# Patient Record
Sex: MALE | Race: WHITE | NOT HISPANIC OR LATINO | Employment: FULL TIME | URBAN - METROPOLITAN AREA
[De-identification: names, ages, dates, MRNs, and addresses within clinical notes are randomized per-mention and may not be internally consistent; named-entity substitution may affect disease eponyms.]

---

## 2024-08-12 ENCOUNTER — OFFICE VISIT (OUTPATIENT)
Dept: PRIMARY CARE | Facility: CLINIC | Age: 64
End: 2024-08-12
Payer: COMMERCIAL

## 2024-08-12 VITALS
TEMPERATURE: 97.6 F | OXYGEN SATURATION: 97 % | SYSTOLIC BLOOD PRESSURE: 114 MMHG | BODY MASS INDEX: 26.68 KG/M2 | RESPIRATION RATE: 16 BRPM | HEART RATE: 54 BPM | WEIGHT: 166 LBS | DIASTOLIC BLOOD PRESSURE: 60 MMHG | HEIGHT: 66 IN

## 2024-08-12 DIAGNOSIS — H61.22 IMPACTED CERUMEN OF LEFT EAR: ICD-10-CM

## 2024-08-12 DIAGNOSIS — Z00.00 ROUTINE MEDICAL EXAM: ICD-10-CM

## 2024-08-12 DIAGNOSIS — Z00.00 ROUTINE GENERAL MEDICAL EXAMINATION AT A HEALTH CARE FACILITY: Primary | ICD-10-CM

## 2024-08-12 DIAGNOSIS — Z80.42 FAMILY HISTORY OF PROSTATE CANCER IN FATHER: ICD-10-CM

## 2024-08-12 DIAGNOSIS — Z12.5 SCREENING FOR MALIGNANT NEOPLASM OF PROSTATE: ICD-10-CM

## 2024-08-12 LAB
BASOPHILS # BLD AUTO: 0.04 X10*3/UL (ref 0–0.1)
BASOPHILS NFR BLD AUTO: 0.8 %
EOSINOPHIL # BLD AUTO: 0.08 X10*3/UL (ref 0–0.7)
EOSINOPHIL NFR BLD AUTO: 1.7 %
ERYTHROCYTE [DISTWIDTH] IN BLOOD BY AUTOMATED COUNT: 12 % (ref 11.5–14.5)
HCT VFR BLD AUTO: 45 % (ref 41–52)
HGB BLD-MCNC: 14.9 G/DL (ref 13.5–17.5)
IMM GRANULOCYTES # BLD AUTO: 0 X10*3/UL (ref 0–0.7)
IMM GRANULOCYTES NFR BLD AUTO: 0 % (ref 0–0.9)
LYMPHOCYTES # BLD AUTO: 2.39 X10*3/UL (ref 1.2–4.8)
LYMPHOCYTES NFR BLD AUTO: 49.7 %
MCH RBC QN AUTO: 30.5 PG (ref 26–34)
MCHC RBC AUTO-ENTMCNC: 33.1 G/DL (ref 32–36)
MCV RBC AUTO: 92 FL (ref 80–100)
MONOCYTES # BLD AUTO: 0.45 X10*3/UL (ref 0.1–1)
MONOCYTES NFR BLD AUTO: 9.4 %
NEUTROPHILS # BLD AUTO: 1.85 X10*3/UL (ref 1.2–7.7)
NEUTROPHILS NFR BLD AUTO: 38.4 %
NRBC BLD-RTO: 0 /100 WBCS (ref 0–0)
PLATELET # BLD AUTO: 196 X10*3/UL (ref 150–450)
RBC # BLD AUTO: 4.88 X10*6/UL (ref 4.5–5.9)
WBC # BLD AUTO: 4.8 X10*3/UL (ref 4.4–11.3)

## 2024-08-12 PROCEDURE — 85025 COMPLETE CBC W/AUTO DIFF WBC: CPT | Mod: WESLAB | Performed by: FAMILY MEDICINE

## 2024-08-12 PROCEDURE — 3008F BODY MASS INDEX DOCD: CPT | Performed by: FAMILY MEDICINE

## 2024-08-12 PROCEDURE — 36415 COLL VENOUS BLD VENIPUNCTURE: CPT | Performed by: FAMILY MEDICINE

## 2024-08-12 PROCEDURE — 80061 LIPID PANEL: CPT | Performed by: FAMILY MEDICINE

## 2024-08-12 PROCEDURE — 1036F TOBACCO NON-USER: CPT | Performed by: FAMILY MEDICINE

## 2024-08-12 PROCEDURE — 84443 ASSAY THYROID STIM HORMONE: CPT | Performed by: FAMILY MEDICINE

## 2024-08-12 PROCEDURE — 84153 ASSAY OF PSA TOTAL: CPT | Performed by: FAMILY MEDICINE

## 2024-08-12 PROCEDURE — 80053 COMPREHEN METABOLIC PANEL: CPT | Performed by: FAMILY MEDICINE

## 2024-08-12 PROCEDURE — 84439 ASSAY OF FREE THYROXINE: CPT | Performed by: FAMILY MEDICINE

## 2024-08-12 PROCEDURE — 99386 PREV VISIT NEW AGE 40-64: CPT | Performed by: FAMILY MEDICINE

## 2024-08-12 PROCEDURE — 69209 REMOVE IMPACTED EAR WAX UNI: CPT | Performed by: FAMILY MEDICINE

## 2024-08-12 ASSESSMENT — ENCOUNTER SYMPTOMS
CARDIOVASCULAR NEGATIVE: 1
GASTROINTESTINAL NEGATIVE: 1
MUSCULOSKELETAL NEGATIVE: 1
CONSTITUTIONAL NEGATIVE: 1
NEUROLOGICAL NEGATIVE: 1
RESPIRATORY NEGATIVE: 1

## 2024-08-12 NOTE — PROGRESS NOTES
"Patient ID: Clarke Guallpa is a 64 y.o. male.    Ear Cerumen Removal    Date/Time: 8/12/2024 9:09 AM    Performed by: Virgil Guerin DO  Authorized by: Virgil Guerin DO    Consent:     Consent obtained:  Verbal    Consent given by:  Patient    Risks, benefits, and alternatives were discussed: yes    Universal protocol:     Patient identity confirmed:  Verbally with patient  Procedure details:     Location:  L ear    Procedure type: irrigation      Procedure outcomes: cerumen removed    Post-procedure details:     Inspection:  Ear canal clear    Hearing quality:  Normal    Procedure completion:  Tolerated  Subjective   Patient ID: Clarke Guallpa is a 64 y.o. male who presents for New Patient Visit (Pt is here to establish care and for fbw. ).    HPI last labs done in 2019 with slightly elevated psa at 4.3 and recheck was 3.7.  glucose slightly elevated at 112 and A1c at 5.6  He had colonoscopy 5 years ago recheck 10 year.  No prior heart issues or testing. Nonsmoker.  Occasional etoh.   Mother decreased from lung ca.  Father with prostate cancer in 70s.   Works as an  in the rubber industry.   Likes to lift wts.     Review of Systems   Constitutional: Negative.    HENT: Negative.     Respiratory: Negative.     Cardiovascular: Negative.    Gastrointestinal: Negative.    Genitourinary: Negative.    Musculoskeletal: Negative.    Neurological: Negative.        Objective   /60 (BP Location: Left arm, Patient Position: Sitting, BP Cuff Size: Adult)   Pulse 54   Temp 36.4 °C (97.6 °F) (Temporal)   Resp 16   Ht 1.676 m (5' 6\")   Wt 75.3 kg (166 lb)   SpO2 97%   BMI 26.79 kg/m²     Physical Exam  Vitals and nursing note reviewed.   Constitutional:       General: He is not in acute distress.  HENT:      Right Ear: Tympanic membrane and ear canal normal.      Left Ear: Tympanic membrane and ear canal normal. There is impacted cerumen.      Nose: Nose normal. No rhinorrhea.      Mouth/Throat:      " Pharynx: Oropharynx is clear. No oropharyngeal exudate or posterior oropharyngeal erythema.      Comments: Dentition wnl  Eyes:      Extraocular Movements: Extraocular movements intact.      Conjunctiva/sclera: Conjunctivae normal.      Pupils: Pupils are equal, round, and reactive to light.   Neck:      Vascular: No carotid bruit.   Cardiovascular:      Rate and Rhythm: Normal rate and regular rhythm.      Heart sounds: Normal heart sounds. No murmur heard.  Pulmonary:      Breath sounds: Normal breath sounds. No wheezing or rhonchi.   Abdominal:      General: Bowel sounds are normal. There is no distension.      Palpations: Abdomen is soft. There is no mass.      Tenderness: There is no abdominal tenderness. There is no guarding or rebound.      Hernia: No hernia is present.   Musculoskeletal:         General: No swelling or tenderness. Normal range of motion.      Cervical back: Normal range of motion and neck supple.   Lymphadenopathy:      Cervical: No cervical adenopathy.   Skin:     General: Skin is warm.      Findings: No rash.   Neurological:      General: No focal deficit present.      Mental Status: He is alert.         Assessment/Plan   Problem List Items Addressed This Visit    None  Visit Diagnoses         Codes    Routine general medical examination at a health care facility    -  Primary Z00.00    Relevant Orders    CBC and Auto Differential    Comprehensive Metabolic Panel    TSH with reflex to Free T4 if abnormal    Lipid Panel    Screening for malignant neoplasm of prostate     Z12.5    Relevant Orders    Prostate Specific Antigen    Family history of prostate cancer in father     Z80.42    Relevant Orders    Prostate Specific Antigen

## 2024-08-13 LAB
ALBUMIN SERPL-MCNC: 4.3 G/DL (ref 3.5–5)
ALP BLD-CCNC: 59 U/L (ref 35–125)
ALT SERPL-CCNC: 17 U/L (ref 5–40)
ANION GAP SERPL CALC-SCNC: 10 MMOL/L
AST SERPL-CCNC: 27 U/L (ref 5–40)
BILIRUB SERPL-MCNC: 0.5 MG/DL (ref 0.1–1.2)
BUN SERPL-MCNC: 20 MG/DL (ref 8–25)
CALCIUM SERPL-MCNC: 9.3 MG/DL (ref 8.5–10.4)
CHLORIDE SERPL-SCNC: 106 MMOL/L (ref 97–107)
CHOLEST SERPL-MCNC: 198 MG/DL (ref 133–200)
CHOLEST/HDLC SERPL: 3.4 {RATIO}
CO2 SERPL-SCNC: 27 MMOL/L (ref 24–31)
CREAT SERPL-MCNC: 1.2 MG/DL (ref 0.4–1.6)
EGFRCR SERPLBLD CKD-EPI 2021: 68 ML/MIN/1.73M*2
GLUCOSE SERPL-MCNC: 100 MG/DL (ref 65–99)
HDLC SERPL-MCNC: 59 MG/DL
LDLC SERPL CALC-MCNC: 125 MG/DL (ref 65–130)
POTASSIUM SERPL-SCNC: 4.5 MMOL/L (ref 3.4–5.1)
PROT SERPL-MCNC: 6.8 G/DL (ref 5.9–7.9)
PSA SERPL-MCNC: 5.2 NG/ML
SODIUM SERPL-SCNC: 143 MMOL/L (ref 133–145)
T4 FREE SERPL-MCNC: 1.3 NG/DL (ref 0.9–1.7)
TRIGL SERPL-MCNC: 69 MG/DL (ref 40–150)
TSH SERPL DL<=0.05 MIU/L-ACNC: 6.26 MIU/L (ref 0.27–4.2)

## 2024-08-14 DIAGNOSIS — R79.89 ELEVATED TSH: ICD-10-CM

## 2024-08-19 ENCOUNTER — HOSPITAL ENCOUNTER (OUTPATIENT)
Dept: RADIOLOGY | Facility: CLINIC | Age: 64
Discharge: HOME | End: 2024-08-19
Payer: COMMERCIAL

## 2024-08-19 DIAGNOSIS — Z00.00 ROUTINE MEDICAL EXAM: ICD-10-CM

## 2024-08-19 PROCEDURE — 75571 CT HRT W/O DYE W/CA TEST: CPT
